# Patient Record
Sex: FEMALE | Race: WHITE | NOT HISPANIC OR LATINO
[De-identification: names, ages, dates, MRNs, and addresses within clinical notes are randomized per-mention and may not be internally consistent; named-entity substitution may affect disease eponyms.]

---

## 2022-03-29 PROBLEM — Z00.00 ENCOUNTER FOR PREVENTIVE HEALTH EXAMINATION: Status: ACTIVE | Noted: 2022-03-29

## 2022-03-30 ENCOUNTER — APPOINTMENT (OUTPATIENT)
Dept: GASTROENTEROLOGY | Facility: CLINIC | Age: 37
End: 2022-03-30
Payer: COMMERCIAL

## 2022-03-30 VITALS
BODY MASS INDEX: 25.91 KG/M2 | DIASTOLIC BLOOD PRESSURE: 80 MMHG | SYSTOLIC BLOOD PRESSURE: 133 MMHG | WEIGHT: 171 LBS | HEART RATE: 75 BPM | OXYGEN SATURATION: 100 % | HEIGHT: 68 IN | TEMPERATURE: 97.6 F

## 2022-03-30 DIAGNOSIS — Z78.9 OTHER SPECIFIED HEALTH STATUS: ICD-10-CM

## 2022-03-30 DIAGNOSIS — E53.8 DEFICIENCY OF OTHER SPECIFIED B GROUP VITAMINS: ICD-10-CM

## 2022-03-30 DIAGNOSIS — K31.89 OTHER DISEASES OF STOMACH AND DUODENUM: ICD-10-CM

## 2022-03-30 PROCEDURE — 99204 OFFICE O/P NEW MOD 45 MIN: CPT

## 2022-03-30 RX ORDER — PNV NO.95/FERROUS FUM/FOLIC AC 28MG-0.8MG
TABLET ORAL
Refills: 0 | Status: ACTIVE | COMMUNITY

## 2022-03-31 PROBLEM — K31.89 SUBEPITHELIAL GASTRIC MASS: Status: ACTIVE | Noted: 2022-03-31

## 2022-03-31 PROBLEM — E53.8 B12 DEFICIENCY: Status: ACTIVE | Noted: 2022-03-31

## 2022-03-31 PROBLEM — Z78.9 KNOWN HEALTH PROBLEMS: NONE: Status: RESOLVED | Noted: 2022-03-30 | Resolved: 2022-03-31

## 2022-03-31 NOTE — HISTORY OF PRESENT ILLNESS
[FreeTextEntry1] : 35 yo female here to arrange repeat EUS of gastric submucosal lesion as well as gastric biopsies.  No abdominal pain.  No N/V/D, no constipation.  No BRBPR, no dark stools.  No weight loss, no change in appetite.  Rare heartburn or reflux.  B12 level has been low since 2018.  Pt now with positive intrinsic factor antibody on 3/23/22.  12/2/19 and 8/2/18 intrinsic factor Ab was negative.  3/10/22 B12 was a little low at 193.   Pt is a vegetarian.  Plan for removal of IUD out at end of April and attempt pregnancy.  \par \par No hx of a colonoscopy.\par \par EGD-EUS on 1/3/2020 showed a medium sized, submucosal mass with a central umbilication on the greater curvature of the gastric antrum, after EUS completed jumbo biopsies were taken.  On EUS the lesion was hypoechoic and heterogeneous, sonographically appeared to originate from the submucosa, measured 5 mm x 11 mm (bx --> antral and body gastric mucosa with mild chronic gastritis and small lamina propria granuloma, no HP; gastric antral mucosa with mild chronic gastritis and fundic mucosa wnl).  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

## 2022-03-31 NOTE — ASSESSMENT
[FreeTextEntry1] : 35 yo female with hx of a gastric submucosal lesion, B12 deficiency with intrinsic factor antibodies\par \par - EGD-EUS with gastric biopsies at [facility] (evaluate submucosal lesion and for atrophic gastritis)\par - NPO after midnight\par - D/w pt regarding need for COVID swab for the procedure as well as escort post-procedure\par - Risks of the procedure including bleeding, perforation, etc d/w the patient\par \par

## 2022-04-01 DIAGNOSIS — Z01.812 ENCOUNTER FOR PREPROCEDURAL LABORATORY EXAMINATION: ICD-10-CM

## 2022-05-10 ENCOUNTER — TRANSCRIPTION ENCOUNTER (OUTPATIENT)
Age: 37
End: 2022-05-10

## 2022-05-10 LAB — SARS-COV-2 N GENE NPH QL NAA+PROBE: NOT DETECTED

## 2022-05-12 ENCOUNTER — APPOINTMENT (OUTPATIENT)
Dept: GASTROENTEROLOGY | Facility: HOSPITAL | Age: 37
End: 2022-05-12

## 2022-05-12 ENCOUNTER — OUTPATIENT (OUTPATIENT)
Dept: OUTPATIENT SERVICES | Facility: HOSPITAL | Age: 37
LOS: 1 days | Discharge: HOME | End: 2022-05-12
Payer: COMMERCIAL

## 2022-05-12 ENCOUNTER — RESULT REVIEW (OUTPATIENT)
Age: 37
End: 2022-05-12

## 2022-05-12 ENCOUNTER — TRANSCRIPTION ENCOUNTER (OUTPATIENT)
Age: 37
End: 2022-05-12

## 2022-05-12 PROCEDURE — C1889: CPT

## 2022-05-12 PROCEDURE — 43237 ENDOSCOPIC US EXAM ESOPH: CPT

## 2022-05-12 PROCEDURE — 43239 EGD BIOPSY SINGLE/MULTIPLE: CPT | Mod: XS

## 2022-05-12 PROCEDURE — 43239 EGD BIOPSY SINGLE/MULTIPLE: CPT

## 2022-05-12 PROCEDURE — 88305 TISSUE EXAM BY PATHOLOGIST: CPT | Mod: 26

## 2022-05-12 PROCEDURE — 88305 TISSUE EXAM BY PATHOLOGIST: CPT

## 2022-05-12 PROCEDURE — 43259 EGD US EXAM DUODENUM/JEJUNUM: CPT

## 2022-05-12 DEVICE — CLIP RESOLUTION 360 235CM: Type: IMPLANTABLE DEVICE | Status: FUNCTIONAL

## 2022-05-12 RX ORDER — OMEPRAZOLE 40 MG/1
40 CAPSULE, DELAYED RELEASE ORAL
Qty: 14 | Refills: 0 | Status: ACTIVE | COMMUNITY
Start: 2022-05-12 | End: 1900-01-01

## 2022-05-13 LAB — SURGICAL PATHOLOGY STUDY: SIGNIFICANT CHANGE UP

## 2022-08-10 ENCOUNTER — APPOINTMENT (OUTPATIENT)
Dept: GASTROENTEROLOGY | Facility: CLINIC | Age: 37
End: 2022-08-10

## (undated) DEVICE — FORCEP RADIAL JAW 4 240CM DISP